# Patient Record
Sex: FEMALE | Race: WHITE | Employment: FULL TIME | URBAN - METROPOLITAN AREA
[De-identification: names, ages, dates, MRNs, and addresses within clinical notes are randomized per-mention and may not be internally consistent; named-entity substitution may affect disease eponyms.]

---

## 2017-10-04 ENCOUNTER — OFFICE VISIT (OUTPATIENT)
Dept: FAMILY MEDICINE | Facility: CLINIC | Age: 37
End: 2017-10-04

## 2017-10-04 VITALS
WEIGHT: 127.6 LBS | DIASTOLIC BLOOD PRESSURE: 88 MMHG | RESPIRATION RATE: 20 BRPM | TEMPERATURE: 98.8 F | BODY MASS INDEX: 21.9 KG/M2 | OXYGEN SATURATION: 97 % | HEART RATE: 72 BPM | SYSTOLIC BLOOD PRESSURE: 116 MMHG

## 2017-10-04 DIAGNOSIS — H61.23 BILATERAL IMPACTED CERUMEN: ICD-10-CM

## 2017-10-04 DIAGNOSIS — H92.01 RIGHT EAR PAIN: Primary | ICD-10-CM

## 2017-10-04 PROCEDURE — 99214 OFFICE O/P EST MOD 30 MIN: CPT | Mod: 25 | Performed by: FAMILY MEDICINE

## 2017-10-04 PROCEDURE — 90686 IIV4 VACC NO PRSV 0.5 ML IM: CPT | Performed by: FAMILY MEDICINE

## 2017-10-04 PROCEDURE — 69210 REMOVE IMPACTED EAR WAX UNI: CPT | Mod: 50 | Performed by: FAMILY MEDICINE

## 2017-10-04 PROCEDURE — 90471 IMMUNIZATION ADMIN: CPT | Performed by: FAMILY MEDICINE

## 2017-10-04 RX ORDER — AMOXICILLIN 500 MG/1
500 CAPSULE ORAL 3 TIMES DAILY
Qty: 30 CAPSULE | Refills: 0 | Status: SHIPPED | OUTPATIENT
Start: 2017-10-04 | End: 2019-08-01

## 2017-10-04 RX ORDER — PREDNISONE 20 MG/1
20 TABLET ORAL DAILY
Qty: 5 TABLET | Refills: 0 | Status: SHIPPED | OUTPATIENT
Start: 2017-10-04 | End: 2019-08-01

## 2017-10-04 ASSESSMENT — ENCOUNTER SYMPTOMS
FEVER: 0
SINUS PAIN: 1
GASTROINTESTINAL NEGATIVE: 1
CARDIOVASCULAR NEGATIVE: 1
HEADACHES: 1
RESPIRATORY NEGATIVE: 1

## 2017-10-04 NOTE — PROGRESS NOTES
"HPI    SUBJECTIVE:   Yasmin Whitt is a 37 year old female who presents to clinic today for the following health issues:    Headache  Onset: x7 days    Description:   Location: unilateral in the right temporal area   Character: throbbing pain, sharp pain, pressure  Frequency:  First episode  Duration:  ongoing    Intensity: 5-8/10    Progression of Symptoms:  worsening    Accompanying Signs & Symptoms:  Stiff neck: no  Neck or upper back pain: no  Fever: no  Sinus pressure: YES  Nausea or vomiting: no  Dizziness: no  Numbness: no  Weakness: YES- feeling fatigued  Visual changes: YES- sensitivity to light    History:   Head trauma: no   Family history of migraines: no  Previous tests for headaches: no  Neurologist evaluations: no  Able to do daily activities: no  Wake with a headaches: YES  Do headaches wake you up: YES  Daily pain medication use: YES  Work/school stressors/changes: no    Precipitating factors:   Does light make it worse: YES  Does sound make it worse: YES    Alleviating factors:  Does sleep help: no    Therapies Tried and outcome: Ibuprofen (Advil, Motrin) and Tylenol; maximum amount allowed; not helping pain at all      Pain from lateral R nose, towards ear, a bit of around the top of her ear and temple.  Was seen by dentist yesterday, did have cavity filled in upper R maxilla.  Didn't change thing.  \"Feels like it's in my head\" and indicated spot from about 12-3 o'clock to ear.  No other real symptoms.  Pain is \"pressure\", seems to wax and wane a bit, warm compresses help.  Tylenol and ibuprofen help a little bit, but still didn't sleep well because of pain last night.  No fever, congestion, cough, sore throat.  No ear drainage.      No history of migraine.  No current meds.    Review of Systems   Constitutional: Negative for fever and malaise/fatigue.   HENT: Positive for ear pain and sinus pain.    Respiratory: Negative.    Cardiovascular: Negative.    Gastrointestinal: Negative.    Skin: " Negative.    Neurological: Positive for headaches.         Physical Exam   Constitutional: She is well-developed, well-nourished, and in no distress. No distress.   HENT:   Right Ear: Tympanic membrane, external ear and ear canal normal.   Left Ear: Tympanic membrane, external ear and ear canal normal.   Mouth/Throat: Oropharynx is clear and moist. No dental abscesses, uvula swelling or dental caries. No oropharyngeal exudate, posterior oropharyngeal edema or posterior oropharyngeal erythema.   KAREY impacted cerumen.  Missing several teeth, but remaining teeth healthy and well appearing.   Eyes: Conjunctivae are normal.   Cardiovascular: Normal rate, regular rhythm and normal heart sounds.    Pulmonary/Chest: Effort normal and breath sounds normal.   Lymphadenopathy:        Head (right side): No submental, no submandibular, no tonsillar, no preauricular, no posterior auricular and no occipital adenopathy present.        Head (left side): No submental, no submandibular, no tonsillar, no preauricular, no posterior auricular and no occipital adenopathy present.     She has no cervical adenopathy.        Right cervical: No superficial cervical, no deep cervical and no posterior cervical adenopathy present.       Left cervical: No superficial cervical, no deep cervical and no posterior cervical adenopathy present.   Skin: Skin is warm and dry. No rash noted.   Nursing note and vitals reviewed.    (Z23) Need for prophylactic vaccination and inoculation against influenza  (primary encounter diagnosis)  Comment:   Plan: HC FLU VAC PRESRV FREE QUAD SPLIT VIR 3+YRS IM         [15389],      ADMIN VACCINE, FIRST [86895]            (H92.01) Right ear pain  Comment: Pain seems atypical for trigeminal neuraliga, although distribution would be appropriate for V-II.  Seen by dentist and teeth OK.  Pain seems exaggerated for cerumen, but pt reports some initial relief after cerumen dismimpaction.  Printed rx for abx and steroid  provided.  If still having significant pain in 24h should start these.  Plan: amoxicillin (AMOXIL) 500 MG capsule, predniSONE        (DELTASONE) 20 MG tablet, REMOVE IMPACTED         CERUMEN            (H61.23) Bilateral impacted cerumen  Comment: Verbal consent obtained.  Cerumen disimpacted using a combination of forced water irrigation, cerumen curette, cerumen loop and forceps.  Otoscopic examination at conclusion of procedure confirms clean ear canal and normal tympanic membrane.    Plan: REMOVE IMPACTED CERUMEN              RTC in 3-4d prn    Bernard Prado MD

## 2017-10-04 NOTE — MR AVS SNAPSHOT
After Visit Summary   10/4/2017    Yasmin Whitt    MRN: [id number]           Patient Information     Date Of Birth          1980        Visit Information        Provider Department      10/4/2017 8:20 AM Bernard Prado MD [facility]        Today's Diagnoses     Need for prophylactic vaccination and inoculation against influenza    -  1    Right ear pain        Bilateral impacted cerumen           Follow-ups after your visit        Who to contact     If you have questions or need follow up information about today's clinic visit or your schedule please contact [facility] directly at [phone].  Normal or non-critical lab and imaging results will be communicated to you by [employer], letter or phone within 4 business days after the clinic has received the results. If you do not hear from us within 7 days, please contact the clinic through [employer] or phone. If you have a critical or abnormal lab result, we will notify you by phone as soon as possible.  Submit refill requests through [employer] or call your pharmacy and they will forward the refill request to us. Please allow 3 business days for your refill to be completed.          Additional Information About Your Visit        MyChart Information     [employer] gives you secure access to your electronic health record. If you see a primary care provider, you can also send messages to your care team and make appointments. If you have questions, please call your primary care clinic.  If you do not have a primary care provider, please call [phone] and they will assist you.        Care EveryWhere ID     This is your Care EveryWhere ID. This could be used by other organizations to access your [facility] medical records  [id number]        Your Vitals Were     Pulse Temperature Respirations Last Period Pulse Oximetry Breastfeeding?    72 98.8  F (37.1  C) (Oral) 20 09/11/2017 97% No    BMI (Body Mass Index)                    21.9 kg/m2            Blood Pressure from Last 3 Encounters:   10/04/17 116/88   06/10/16 120/86   04/13/16 108/74    Weight from Last 3 Encounters:   10/04/17 127 lb 9.6 oz (57.9 kg)   06/10/16 134 lb (60.8 kg)   04/13/16 129 lb (58.5 kg)              We Performed the Following          ADMIN VACCINE, FIRST [99240]     HC FLU VAC PRESRV FREE QUAD SPLIT VIR 3+YRS IM  [12973]     REMOVE IMPACTED CERUMEN          Today's Medication Changes          These changes are accurate as of: 10/4/17  9:11 AM.  If you have any questions, ask your nurse or doctor.               Start taking these medicines.        Dose/Directions    amoxicillin 500 MG capsule   Commonly known as:  AMOXIL   Used for:  Right ear pain   Started by:  Bernard Prado MD        Dose:  500 mg   Take 1 capsule (500 mg) by mouth 3 times daily   Quantity:  30 capsule   Refills:  0       predniSONE 20 MG tablet   Commonly known as:  DELTASONE   Used for:  Right ear pain   Started by:  Bernard Prado MD        Dose:  20 mg   Take 1 tablet (20 mg) by mouth daily   Quantity:  5 tablet   Refills:  0         Stop taking these medicines if you haven't already. Please contact your care team if you have questions.     citalopram 10 MG tablet   Commonly known as:  celeXA   Stopped by:  Bernard Prado MD           LORazepam 0.5 MG tablet   Commonly known as:  ATIVAN   Stopped by:  Bernard Prado MD           norgestim-eth estrad triphasic 0.18/0.215/0.25 MG-35 MCG per tablet   Commonly known as:  TRINESSA (28)   Stopped by:  Bernard Prado MD                Where to get your medicines      These medications were sent to [facility]/pharmacy #[id number] - [place], [place] - [address]  [address], [facility] [address]     Phone:  [phone]     predniSONE 20 MG tablet         Some of these will need a paper prescription and others can be bought over the counter.  Ask your nurse if you have questions.     Bring a  paper prescription for each of these medications     amoxicillin 500 MG capsule                Primary Care Provider Office Phone # Fax #    ENEIDA Neal [phone] [address] [facility] [address]        Equal Access to Services     HOLA DURON : Hadii aad ku hadchaseo Soomaali, waaxda luqadaha, qaybta kaalmada adeegyada, waxay idiin hayaan adesushila rivastrentonranjeet ngo. So [facility] [phone].    ATENCIÓN: Si habla español, tiene a valencia disposición servicios gratuitos de asistencia lingüística. Llame al [phone].    We comply with applicable federal civil rights laws and [place] laws. We do not discriminate on the basis of race, color, national origin, age, disability, sex, sexual orientation, or gender identity.            Thank you!     Thank you for choosing [facility]  for your care. Our goal is always to provide you with excellent care. Hearing back from our patients is one way we can continue to improve our services. Please take a few minutes to complete the written survey that you may receive in the mail after your visit with us. Thank you!             Your Updated Medication List - Protect others around you: Learn how to safely use, store and throw away your medicines at www.disposemymeds.org.          This list is accurate as of: 10/4/17  9:11 AM.  Always use your most recent med list.                   Brand Name Dispense Instructions for use Diagnosis    amoxicillin 500 MG capsule    AMOXIL    30 capsule    Take 1 capsule (500 mg) by mouth 3 times daily    Right ear pain       predniSONE 20 MG tablet    DELTASONE    5 tablet    Take 1 tablet (20 mg) by mouth daily    Right ear pain

## 2017-10-04 NOTE — NURSING NOTE
"Chief Complaint   Patient presents with     Headache     Musculoskeletal Problem     jaw pain       Initial /88  Pulse 72  Temp 98.8  F (37.1  C) (Oral)  Resp 20  Wt 127 lb 9.6 oz (57.9 kg)  LMP 09/11/2017  SpO2 97%  Breastfeeding? No  BMI 21.9 kg/m2 Estimated body mass index is 21.9 kg/(m^2) as calculated from the following:    Height as of 6/10/16: 5' 4\" (1.626 m).    Weight as of this encounter: 127 lb 9.6 oz (57.9 kg).  Medication Reconciliation: complete   Nini Wiggins CMA ([facility])      "

## 2017-10-04 NOTE — Clinical Note
Nini,  Encounter shows an open flu shot.  Please finalize and then OK to close encounter.  thi ARNOLD

## 2018-08-17 ENCOUNTER — HEALTH MAINTENANCE LETTER (OUTPATIENT)
Age: 38
End: 2018-08-17

## 2018-10-02 ENCOUNTER — MYC MEDICAL ADVICE (OUTPATIENT)
Dept: FAMILY MEDICINE | Facility: CLINIC | Age: 38
End: 2018-10-02

## 2018-10-05 NOTE — TELEPHONE ENCOUNTER
Panel Management Review      Patient has the following on her problem list: None      Composite cancer screening  Chart review shows that this patient is due/due soon for the following Pap Smear  Summary:    Patient is due/failing the following:   PAP    Action needed:   Patient needs office visit for pap smear.    Type of outreach:    Sent [employer] message.    Questions for provider review:    None                                                                                                                                    Nini Wiggins CMA ([facility])

## 2019-08-01 ENCOUNTER — OFFICE VISIT (OUTPATIENT)
Dept: FAMILY MEDICINE | Facility: CLINIC | Age: 39
End: 2019-08-01
Payer: COMMERCIAL

## 2019-08-01 VITALS
RESPIRATION RATE: 14 BRPM | HEART RATE: 80 BPM | TEMPERATURE: 98.6 F | DIASTOLIC BLOOD PRESSURE: 78 MMHG | BODY MASS INDEX: 21.37 KG/M2 | WEIGHT: 124.5 LBS | SYSTOLIC BLOOD PRESSURE: 108 MMHG

## 2019-08-01 DIAGNOSIS — Z91.013 SHELLFISH ALLERGY: Primary | ICD-10-CM

## 2019-08-01 PROCEDURE — 99213 OFFICE O/P EST LOW 20 MIN: CPT | Performed by: PHYSICIAN ASSISTANT

## 2019-08-01 RX ORDER — EPINEPHRINE 0.3 MG/.3ML
0.3 INJECTION SUBCUTANEOUS PRN
Qty: 2 EACH | Refills: 0 | Status: SHIPPED | OUTPATIENT
Start: 2019-08-01

## 2019-08-01 NOTE — PROGRESS NOTES
Subjective     Yasmin Song is a 39 year old female who presents to clinic today for the following health issues:    HPI   Patient is here to request getting an EPI pen because [place] and can't be exposed to shellfish. She is traveling on a [place] cruise and will be around shellfish. Allergy is not only consuming, but also touching shellfish. Has never had or used an epi pen in the past. No current symptoms.       Patient Active Problem List   Diagnosis     CARDIOVASCULAR SCREENING; LDL GOAL LESS THAN 160     Panic attack     History of gestational diabetes mellitus (GDM)     Past Surgical History:   Procedure Laterality Date     CYSTOSCOPY, LITHOTRIPSY, COMBINED  3/4/2014    Procedure: COMBINED CYSTOSCOPY, LITHOTRIPSY;    Removal of right double j stent, bilateral lithotripsy, Flexible Cysto;  Surgeon: Luis Angel Cantrell MD;  Location:  OR     CYSTOSCOPY, RETROGRADES, INSERT STENT URETER(S), COMBINED  12/10/2013    Procedure: COMBINED CYSTOSCOPY, RETROGRADES, INSERT STENT URETER(S);  COMBINED non video CYSTOSCOPY, INSERT DOUBLE J STENT URETER- RIGHT ;  Surgeon: Luis Angel Cantrell MD;  Location:  OR     GENITOURINARY SURGERY      fot kidney stone     ORTHOPEDIC SURGERY      bunion surg right foot       Social History     Tobacco Use     Smoking status: Light Tobacco Smoker     Packs/day: 0.25     Types: Cigarettes     Smokeless tobacco: Never Used     Tobacco comment: Former 3 cigarettes a day smoker, now smokes socially if drinking   Substance Use Topics     Alcohol use: Yes     Alcohol/week: 3.6 oz     Types: 6 Standard drinks or equivalent per week     Comment: occ     Family History   Problem Relation Age of Onset     Depression Mother      Cardiovascular Father      Lipids Father      Cerebrovascular Disease Father      Diabetes Father         Type 2     Hypertension Father          Current Outpatient Medications   Medication Sig Dispense Refill      EPINEPHrine (EPIPEN/ADRENACLICK/OR ANY BX GENERIC EQUIV) 0.3 MG/0.3ML injection 2-pack Inject 0.3 mLs (0.3 mg) into the muscle as needed for anaphylaxis 2 each 0     Allergies   Allergen Reactions     Shellfish-Derived Products      Shrimp Itching     Recent Labs   Lab Test 06/10/16  0928 03/04/14  1325   A1C 5.6  --    CR 0.68 0.92   GFRESTIMATED >90  Non  GFR Calc   71   GFRESTBLACK >90   GFR Calc   86   POTASSIUM 4.2 3.7   TSH 1.61  --       BP Readings from Last 3 Encounters:   08/01/19 108/78   10/04/17 116/88   06/10/16 120/86    Wt Readings from Last 3 Encounters:   08/01/19 56.5 kg (124 lb 8 oz)   10/04/17 57.9 kg (127 lb 9.6 oz)   06/10/16 60.8 kg (134 lb)                    Reviewed and updated as needed this visit by Provider         Review of Systems   ROS COMP: Constitutional, HEENT, cardiovascular, pulmonary, gi and gu systems are negative, except as otherwise noted.      Objective    /78 (BP Location: Right arm, Patient Position: Chair, Cuff Size: Adult Regular)   Pulse 80   Temp 98.6  F (37  C) (Oral)   Resp 14   Wt 56.5 kg (124 lb 8 oz)   BMI 21.37 kg/m    Body mass index is 21.37 kg/m .  Physical Exam   GENERAL: healthy, alert and no distress  RESP: no respiratory distress.   SKIN: no suspicious lesions or rashes  PSYCH: mentation appears normal, affect normal/bright    Diagnostic Test Results:  none         Assessment & Plan     (Z91.013) Shellfish allergy  (primary encounter diagnosis)  Comment: epi pen sent. Discussed avoidance techniques and how/when to use. Recommending discussing with pharmacist as well.   Plan: EPINEPHrine (EPIPEN/ADRENACLICK/OR ANY BX         GENERIC EQUIV) 0.3 MG/0.3ML injection 2-pack        -Medication use and side effects discussed with the patient. Patient is in complete understanding and agreement with plan.             Follow up: as below. Patient denies pap and scheduling one today.     Return in about 1 month (around  9/1/2019) for Routine Visit, pap, with pcp (denied scheduling today or obtaining pap today).    Kvng Hernadez PA-C  [facility]

## 2019-11-20 ENCOUNTER — TELEPHONE (OUTPATIENT)
Dept: FAMILY MEDICINE | Facility: CLINIC | Age: 39
End: 2019-11-20

## 2019-11-20 NOTE — TELEPHONE ENCOUNTER
Patient Quality Outreach      Patient has the following on her problem list: None    Composite cancer screening  Chart review shows that this patient is due/due soon for the following Pap Smear  Patient declined cancer screening. No  Summary:    Patient is due/failing the following:   Cervical Cancer Screening - PAP Needed    Type of outreach:    Phone, spoke to patient.  pt declines to schedule currently has no insurance pt states she will call back to schedule after January    Questions for provider review:    None                                                                                                                                    CATALINA Betancourt       Chart routed to Care Team.

## 2019-12-08 ENCOUNTER — HEALTH MAINTENANCE LETTER (OUTPATIENT)
Age: 39
End: 2019-12-08

## 2020-02-26 ENCOUNTER — TELEPHONE (OUTPATIENT)
Dept: FAMILY MEDICINE | Facility: CLINIC | Age: 40
End: 2020-02-26

## 2020-02-26 ENCOUNTER — OFFICE VISIT (OUTPATIENT)
Dept: FAMILY MEDICINE | Facility: CLINIC | Age: 40
End: 2020-02-26

## 2020-02-26 VITALS
HEART RATE: 77 BPM | SYSTOLIC BLOOD PRESSURE: 125 MMHG | BODY MASS INDEX: 20.26 KG/M2 | TEMPERATURE: 98.3 F | OXYGEN SATURATION: 98 % | WEIGHT: 118.7 LBS | DIASTOLIC BLOOD PRESSURE: 75 MMHG | HEIGHT: 64 IN | RESPIRATION RATE: 18 BRPM

## 2020-02-26 DIAGNOSIS — M54.2 CERVICAL SPINE PAIN: ICD-10-CM
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
## 2020-02-26 DIAGNOSIS — R51.9 OCCIPITAL HEADACHE: ICD-10-CM

## 2020-02-26 DIAGNOSIS — M25.521 RIGHT ELBOW PAIN: ICD-10-CM

## 2020-02-26 PROCEDURE — 99214 OFFICE O/P EST MOD 30 MIN: CPT | Performed by: FAMILY MEDICINE

## 2020-02-26 ASSESSMENT — MIFFLIN-ST. JEOR: SCORE: 1200.48
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
## 2020-02-26 NOTE — TELEPHONE ENCOUNTER
Patient Contact    Attempt # 1    Was call answered?  No.  Left detailed message on voicemail with information, call if ?'s  Viviana Ogden RN, BSN  Message handled by Nurse Triage.
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
## 2020-03-15 ENCOUNTER — HEALTH MAINTENANCE LETTER (OUTPATIENT)
Age: 40
End: 2020-03-15

## 2020-10-05 ENCOUNTER — OFFICE VISIT (OUTPATIENT)
Dept: FAMILY MEDICINE | Facility: CLINIC | Age: 40
End: 2020-10-05

## 2020-10-05 VITALS
WEIGHT: 123 LBS | HEART RATE: 70 BPM | OXYGEN SATURATION: 98 % | SYSTOLIC BLOOD PRESSURE: 120 MMHG | BODY MASS INDEX: 21.45 KG/M2 | TEMPERATURE: 98 F | DIASTOLIC BLOOD PRESSURE: 81 MMHG

## 2020-10-05 DIAGNOSIS — Z01.818 PREOP GENERAL PHYSICAL EXAM: Primary | ICD-10-CM

## 2020-10-05 DIAGNOSIS — Z41.1 ENCOUNTER FOR BREAST AUGMENTATION: ICD-10-CM

## 2020-10-05 LAB — HGB BLD-MCNC: 14.6 G/DL (ref 11.7–15.7)

## 2020-10-05 PROCEDURE — 85018 HEMOGLOBIN: CPT | Performed by: FAMILY MEDICINE

## 2020-10-05 PROCEDURE — 36415 COLL VENOUS BLD VENIPUNCTURE: CPT | Performed by: FAMILY MEDICINE

## 2020-10-05 PROCEDURE — 99214 OFFICE O/P EST MOD 30 MIN: CPT | Performed by: FAMILY MEDICINE

## 2020-10-05 RX ORDER — CHLORAL HYDRATE 500 MG
2 CAPSULE ORAL DAILY
COMMUNITY

## 2020-10-05 RX ORDER — MULTIVIT-MIN/IRON/FOLIC ACID/K 18-600-40
CAPSULE ORAL
COMMUNITY

## 2020-10-05 RX ORDER — MULTIPLE VITAMINS W/ MINERALS TAB 9MG-400MCG
1 TAB ORAL DAILY
COMMUNITY

## 2020-10-05 RX ORDER — CALCIUM CARBONATE 260MG(650)
TABLET,CHEWABLE ORAL
COMMUNITY

## 2020-10-05 SDOH — HEALTH STABILITY: MENTAL HEALTH: HOW MANY STANDARD DRINKS CONTAINING ALCOHOL DO YOU HAVE ON A TYPICAL DAY?: 3 OR 4

## 2020-10-05 SDOH — HEALTH STABILITY: MENTAL HEALTH: HOW OFTEN DO YOU HAVE A DRINK CONTAINING ALCOHOL?: 2-3 TIMES A WEEK

## 2020-10-05 SDOH — HEALTH STABILITY: MENTAL HEALTH: HOW OFTEN DO YOU HAVE 6 OR MORE DRINKS ON ONE OCCASION?: LESS THAN MONTHLY

## 2020-10-05 ASSESSMENT — PATIENT HEALTH QUESTIONNAIRE - PHQ9: SUM OF ALL RESPONSES TO PHQ QUESTIONS 1-9: 0

## 2020-10-05 NOTE — PATIENT INSTRUCTIONS

## 2020-10-05 NOTE — PROGRESS NOTES
[facility]  [id number] [place] [id number]  Phone: [phone]  Primary Provider: Rimma Quick  Pre-op Performing Provider: SARANYA KOROMA    PREOPERATIVE EVALUATION:  Today's date: 10/5/2020    Yasmin Song is a 40 year old female who presents for a preoperative evaluation for breast augmentation.    Surgical Information:  Surgery Details 10/5/2020   Surgery/Procedure: breast augmentation   Surgery Location: [facility]   Surgeon: Cayden Bautista   Surgery Date: 10/14/2020   Time of Surgery: 1:00pm   Where patient plans to recover: At home with family   Additional recovery plan details: N/A     Fax number for surgical facility: [phone]  Type of Anesthesia Anticipated: General    Subjective      HPI related to upcoming procedure: Desires breast augmentation.    Preop Questions 10/5/2020   1. Have you ever had a heart attack or stroke? No   2. Have you ever had surgery on your heart or blood vessels, such as a stent placement, a coronary artery bypass, or surgery on an artery in your head, neck, heart, or legs? No   3. Do you have chest pain with activity? No   4. Do you have a history of  heart failure? No   5. Do you currently have a cold, bronchitis or symptoms of other infection? No   6. Do you have a cough, shortness of breath, or wheezing? No   7. Do you or anyone in your family have previous history of blood clots? No   8. Do you or does anyone in your family have a serious bleeding problem such as prolonged bleeding following surgeries or cuts? No   9. Have you ever had problems with anemia or been told to take iron pills? No   10. Have you had any abnormal blood loss such as black, tarry or bloody stools, or abnormal vaginal bleeding? No   11. Have you ever had a blood transfusion? No   Are you willing to have a blood transfusion if it is medically needed before, during, or after your surgery? Yes   13. Have you or any  of your relatives ever had problems with anesthesia? No   14. Do you have sleep apnea, excessive snoring or daytime drowsiness? No   15. Do you have any artifical heart valves or other implanted medical devices like a pacemaker, defibrillator, or continuous glucose monitor? No   16. Do you have artificial joints? No   17. Are you allergic to latex? No   18. Is there any chance that you may be pregnant? No     Patient does not have a Health Care Directive or Living Will: Discussed advance care planning with patient; information given to patient to review.    [place] ([place] reviewed:  reviewed- no concerns    See problem list for active medical problems.  Problems all longstanding and stable, except as noted/documented.  See ROS for pertinent symptoms related to these conditions.      Review of Systems  CONSTITUTIONAL: NEGATIVE for fever, chills, change in weight  INTEGUMENTARY/SKIN: NEGATIVE for worrisome rashes, moles or lesions  EYES: NEGATIVE for vision changes or irritation  ENT/MOUTH: NEGATIVE for ear, mouth and throat problems  RESP: NEGATIVE for significant cough or SOB  BREAST: NEGATIVE for masses, tenderness or discharge. + left nipple, white bump  CV: NEGATIVE for chest pain, palpitations or peripheral edema  GI: NEGATIVE for nausea, abdominal pain, heartburn, or change in bowel habits  : NEGATIVE for frequency, dysuria, or hematuria  MUSCULOSKELETAL: NEGATIVE for significant arthralgias or myalgia  NEURO: NEGATIVE for weakness, dizziness or paresthesias  ENDOCRINE: NEGATIVE for temperature intolerance, skin/hair changes  HEME: NEGATIVE for bleeding problems  PSYCHIATRIC: NEGATIVE for changes in mood or affect    Patient Active Problem List    Diagnosis Date Noted     CARDIOVASCULAR SCREENING; LDL GOAL LESS THAN 160 09/18/2012     Priority: Medium      Past Medical History:   Diagnosis Date     Diabetes mellitus     gestational only     Dysthymic disorder 05/22/2008     History of  gestational diabetes mellitus (GDM) 06/10/2016     Kidney stone 08/15/2008    Overview:  CT 8/15/2008: 1.  Bilateral renal collecting system stones, without current definite evidence of obstruction.  The largest stone on the left measures 11 x 5 mm and 13,075 Hounsfield units.  At least four additional developing papillary tip stones are seen within the left kidney.   2.  Several nonobstructing, developing stones within the right renal collecting system, the largest of whic     kidney stones     multiple hx kidney stones, is 2nd surg for stones     Panic attack 04/13/2016     Supervision of other normal pregnancy 06/13/2006     Past Surgical History:   Procedure Laterality Date     CYSTOSCOPY, LITHOTRIPSY, COMBINED  3/4/2014    Procedure: COMBINED CYSTOSCOPY, LITHOTRIPSY;    Removal of right double j stent, bilateral lithotripsy, Flexible Cysto;  Surgeon: Luis Angel Cantrell MD;  Location:  OR     CYSTOSCOPY, RETROGRADES, INSERT STENT URETER(S), COMBINED  12/10/2013    Procedure: COMBINED CYSTOSCOPY, RETROGRADES, INSERT STENT URETER(S);  COMBINED non video CYSTOSCOPY, INSERT DOUBLE J STENT URETER- RIGHT ;  Surgeon: Luis Angel Cantrell MD;  Location: [facility] OR     GENITOURINARY SURGERY      fot kidney stone     ORTHOPEDIC SURGERY      bunion surg right foot     Current Outpatient Medications   Medication Sig Dispense Refill     EPINEPHrine (EPIPEN/ADRENACLICK/OR ANY BX GENERIC EQUIV) 0.3 MG/0.3ML injection 2-pack Inject 0.3 mLs (0.3 mg) into the muscle as needed for anaphylaxis 2 each 0     fish oil-omega-3 fatty acids 1000 MG capsule Take 2 g by mouth daily       HEMP OIL OR EXTRACT OR OTHER CBD CANNABINOID, NOT MEDICAL CANNABIS,        Magnesium Citrate 100 MG TABS        multivitamin w/minerals (THERA-VIT-M) tablet Take 1 tablet by mouth daily       Vitamin D, Cholecalciferol, 25 MCG (1000 UT) TABS          Allergies   Allergen Reactions     Shellfish-Derived Products      Shrimp Itching        Social History      Tobacco Use     Smoking status: Former Smoker     Packs/day: 0.25     Types: Cigarettes     Smokeless tobacco: Never Used     Tobacco comment: Former 3 cigarettes a day smoker, now smokes socially if drinking   Substance Use Topics     Alcohol use: Yes     Alcohol/week: 6.0 standard drinks     Types: 6 Standard drinks or equivalent per week     Frequency: 2-3 times a week     Drinks per session: 3 or 4     Binge frequency: Less than monthly     Comment: occ     Family history not pertinent  History   Drug Use No            Objective   /81   Pulse 70   Temp 98  F (36.7  C) (Oral)   Wt 55.8 kg (123 lb)   LMP 09/28/2020 (Exact Date)   SpO2 98%   BMI 21.45 kg/m    Physical Exam    GENERAL APPEARANCE: healthy, alert and no distress     EYES: EOMI, PERRL     HENT: ear canals and TM's normal and nose and mouth without ulcers or lesions     NECK: no adenopathy, no asymmetry, masses, or scars and thyroid normal to palpation     RESP: lungs clear to auscultation - no rales, rhonchi or wheezes     BREAST: normal without masses, tenderness or nipple discharge.  Left nipple when squeezed has a white spot within nipple, underneath skin, unable to express material, no erythema or edema.  Non-tender. no palpable axillary masses or adenopathy     CV: regular rates and rhythm, normal S1 S2, no S3 or S4 and no murmur, click or rub     ABDOMEN:  soft, nontender, no HSM or masses and bowel sounds normal     MS: extremities normal- no gross deformities noted, no evidence of inflammation in joints, FROM in all extremities.     SKIN: no suspicious lesions or rashes     NEURO: Normal strength and tone, sensory exam grossly normal, mentation intact and speech normal     PSYCH: mentation appears normal. and affect normal/bright     LYMPHATICS: No cervical adenopathy      PRE-OP Diagnostics:  Hemoglobin pending  No EKG required, no history of coronary heart disease, significant arrhythmia, peripheral arterial disease or other  structural heart disease.         Assessment & Plan   The proposed surgical procedure is considered INTERMEDIATE risk.    REVISED CARDIAC RISK INDEX  The patient has the following serious cardiovascular risks for perioperative complications:  No serious cardiac risks = 0 points    INTERPRETATION: 0 points: Class I (very low risk - 0.4% complication rate)         ICD-10-CM    1. Preop general physical exam  Z01.818 Hemoglobin   2. Encounter for breast augmentation  Z41.1        The patient has the following additional risks and recommendations for perioperative complications:         MEDICATION INSTRUCTIONS:  Patient is on no chronic medications  Patient has stopped all supplements/vitamins prior to procedure.    RECOMMENDATION:  APPROVAL GIVEN to proceed with proposed procedure, without further diagnostic evaluation.    Return in about 6 months (around 4/5/2021) for Preventative Care Visit.    Signed Electronically by: Tessa Altman MD    Copy of this evaluation report is provided to requesting physician.    [facility] Preop Guidelines    Revised Cardiac Risk Index

## 2021-01-09 ENCOUNTER — HEALTH MAINTENANCE LETTER (OUTPATIENT)
Age: 41
End: 2021-01-09

## 2021-10-23 ENCOUNTER — HEALTH MAINTENANCE LETTER (OUTPATIENT)
Age: 41
End: 2021-10-23

## 2022-02-12 ENCOUNTER — HEALTH MAINTENANCE LETTER (OUTPATIENT)
Age: 42
End: 2022-02-12

## 2022-10-09 ENCOUNTER — HEALTH MAINTENANCE LETTER (OUTPATIENT)
Age: 42
End: 2022-10-09

## 2022-11-26 ENCOUNTER — HEALTH MAINTENANCE LETTER (OUTPATIENT)
Age: 42
End: 2022-11-26

## 2024-01-06 ENCOUNTER — HEALTH MAINTENANCE LETTER (OUTPATIENT)
Age: 44
End: 2024-01-06

## 2024-03-16 ENCOUNTER — HEALTH MAINTENANCE LETTER (OUTPATIENT)
Age: 44
End: 2024-03-16